# Patient Record
Sex: MALE | Race: WHITE | Employment: FULL TIME | URBAN - METROPOLITAN AREA
[De-identification: names, ages, dates, MRNs, and addresses within clinical notes are randomized per-mention and may not be internally consistent; named-entity substitution may affect disease eponyms.]

---

## 2017-07-26 PROBLEM — E78.5 HYPERLIPIDEMIA LDL GOAL <100: Status: ACTIVE | Noted: 2017-07-26

## 2017-07-26 PROBLEM — N20.0 KIDNEY STONES: Status: ACTIVE | Noted: 2017-07-26

## 2017-09-25 PROCEDURE — 88305 TISSUE EXAM BY PATHOLOGIST: CPT | Performed by: INTERNAL MEDICINE

## 2017-09-27 PROBLEM — J30.1 ACUTE SEASONAL ALLERGIC RHINITIS DUE TO POLLEN: Status: ACTIVE | Noted: 2017-09-27

## 2018-11-06 PROBLEM — I10 ESSENTIAL HYPERTENSION: Status: ACTIVE | Noted: 2018-11-06

## 2018-11-06 PROCEDURE — 84402 ASSAY OF FREE TESTOSTERONE: CPT | Performed by: FAMILY MEDICINE

## 2018-11-06 PROCEDURE — 84403 ASSAY OF TOTAL TESTOSTERONE: CPT | Performed by: FAMILY MEDICINE

## 2018-11-06 PROCEDURE — 86803 HEPATITIS C AB TEST: CPT | Performed by: FAMILY MEDICINE

## 2021-01-19 PROBLEM — R42 VERTIGO: Status: ACTIVE | Noted: 2017-09-07

## 2021-03-25 ENCOUNTER — APPOINTMENT (OUTPATIENT)
Dept: GENERAL RADIOLOGY | Facility: HOSPITAL | Age: 66
DRG: 177 | End: 2021-03-25
Attending: EMERGENCY MEDICINE
Payer: COMMERCIAL

## 2021-03-25 ENCOUNTER — HOSPITAL ENCOUNTER (INPATIENT)
Facility: HOSPITAL | Age: 66
LOS: 9 days | Discharge: HOME | DRG: 177 | End: 2021-04-03
Attending: EMERGENCY MEDICINE | Admitting: STUDENT IN AN ORGANIZED HEALTH CARE EDUCATION/TRAINING PROGRAM
Payer: COMMERCIAL

## 2021-03-25 ENCOUNTER — APPOINTMENT (OUTPATIENT)
Dept: CT IMAGING | Facility: HOSPITAL | Age: 66
DRG: 177 | End: 2021-03-25
Attending: EMERGENCY MEDICINE
Payer: COMMERCIAL

## 2021-03-25 DIAGNOSIS — U07.1 PNEUMONIA DUE TO COVID-19 VIRUS: Primary | ICD-10-CM

## 2021-03-25 DIAGNOSIS — R09.02 HYPOXIA: ICD-10-CM

## 2021-03-25 DIAGNOSIS — J12.82 PNEUMONIA DUE TO COVID-19 VIRUS: Primary | ICD-10-CM

## 2021-03-25 PROBLEM — D69.6 THROMBOCYTOPENIA (HCC): Status: ACTIVE | Noted: 2021-03-25

## 2021-03-25 PROCEDURE — 83880 ASSAY OF NATRIURETIC PEPTIDE: CPT | Performed by: EMERGENCY MEDICINE

## 2021-03-25 PROCEDURE — 80048 BASIC METABOLIC PNL TOTAL CA: CPT | Performed by: EMERGENCY MEDICINE

## 2021-03-25 PROCEDURE — 99285 EMERGENCY DEPT VISIT HI MDM: CPT

## 2021-03-25 PROCEDURE — 84145 PROCALCITONIN (PCT): CPT | Performed by: EMERGENCY MEDICINE

## 2021-03-25 PROCEDURE — 83036 HEMOGLOBIN GLYCOSYLATED A1C: CPT | Performed by: HOSPITALIST

## 2021-03-25 PROCEDURE — 85025 COMPLETE CBC W/AUTO DIFF WBC: CPT | Performed by: EMERGENCY MEDICINE

## 2021-03-25 PROCEDURE — 3E0333Z INTRODUCTION OF ANTI-INFLAMMATORY INTO PERIPHERAL VEIN, PERCUTANEOUS APPROACH: ICD-10-PCS | Performed by: STUDENT IN AN ORGANIZED HEALTH CARE EDUCATION/TRAINING PROGRAM

## 2021-03-25 PROCEDURE — 71260 CT THORAX DX C+: CPT | Performed by: EMERGENCY MEDICINE

## 2021-03-25 PROCEDURE — 82728 ASSAY OF FERRITIN: CPT | Performed by: EMERGENCY MEDICINE

## 2021-03-25 PROCEDURE — XW033E5 INTRODUCTION OF REMDESIVIR ANTI-INFECTIVE INTO PERIPHERAL VEIN, PERCUTANEOUS APPROACH, NEW TECHNOLOGY GROUP 5: ICD-10-PCS | Performed by: STUDENT IN AN ORGANIZED HEALTH CARE EDUCATION/TRAINING PROGRAM

## 2021-03-25 PROCEDURE — 85379 FIBRIN DEGRADATION QUANT: CPT | Performed by: EMERGENCY MEDICINE

## 2021-03-25 PROCEDURE — 84484 ASSAY OF TROPONIN QUANT: CPT | Performed by: EMERGENCY MEDICINE

## 2021-03-25 PROCEDURE — 81001 URINALYSIS AUTO W/SCOPE: CPT | Performed by: EMERGENCY MEDICINE

## 2021-03-25 PROCEDURE — 93005 ELECTROCARDIOGRAM TRACING: CPT

## 2021-03-25 PROCEDURE — 93010 ELECTROCARDIOGRAM REPORT: CPT | Performed by: EMERGENCY MEDICINE

## 2021-03-25 PROCEDURE — 96374 THER/PROPH/DIAG INJ IV PUSH: CPT

## 2021-03-25 PROCEDURE — 71045 X-RAY EXAM CHEST 1 VIEW: CPT | Performed by: EMERGENCY MEDICINE

## 2021-03-25 PROCEDURE — 86140 C-REACTIVE PROTEIN: CPT | Performed by: EMERGENCY MEDICINE

## 2021-03-25 PROCEDURE — 87040 BLOOD CULTURE FOR BACTERIA: CPT | Performed by: EMERGENCY MEDICINE

## 2021-03-25 PROCEDURE — 80076 HEPATIC FUNCTION PANEL: CPT | Performed by: HOSPITALIST

## 2021-03-25 RX ORDER — ASCORBIC ACID 500 MG
1000 TABLET ORAL DAILY
Status: DISCONTINUED | OUTPATIENT
Start: 2021-03-25 | End: 2021-04-03

## 2021-03-25 RX ORDER — AMLODIPINE BESYLATE AND BENAZEPRIL HYDROCHLORIDE 5; 10 MG/1; MG/1
1 CAPSULE ORAL DAILY
Status: DISCONTINUED | OUTPATIENT
Start: 2021-03-25 | End: 2021-03-25

## 2021-03-25 RX ORDER — POTASSIUM CHLORIDE 20 MEQ/1
40 TABLET, EXTENDED RELEASE ORAL ONCE
Status: COMPLETED | OUTPATIENT
Start: 2021-03-25 | End: 2021-03-25

## 2021-03-25 RX ORDER — DEXAMETHASONE SODIUM PHOSPHATE 4 MG/ML
6 VIAL (ML) INJECTION DAILY
Status: COMPLETED | OUTPATIENT
Start: 2021-03-25 | End: 2021-04-03

## 2021-03-25 RX ORDER — BENZONATATE 100 MG/1
100 CAPSULE ORAL 3 TIMES DAILY PRN
Status: DISCONTINUED | OUTPATIENT
Start: 2021-03-25 | End: 2021-04-03

## 2021-03-25 RX ORDER — ZINC SULFATE 50(220)MG
220 CAPSULE ORAL 2 TIMES DAILY
Status: DISCONTINUED | OUTPATIENT
Start: 2021-03-25 | End: 2021-04-03

## 2021-03-25 RX ORDER — GUAIFENESIN 600 MG
600 TABLET, EXTENDED RELEASE 12 HR ORAL 2 TIMES DAILY
Status: DISCONTINUED | OUTPATIENT
Start: 2021-03-25 | End: 2021-04-03

## 2021-03-25 RX ORDER — ENOXAPARIN SODIUM 100 MG/ML
40 INJECTION SUBCUTANEOUS DAILY
Status: DISCONTINUED | OUTPATIENT
Start: 2021-03-25 | End: 2021-04-03

## 2021-03-25 RX ORDER — ASPIRIN 81 MG/1
81 TABLET, CHEWABLE ORAL DAILY
Status: DISCONTINUED | OUTPATIENT
Start: 2021-03-25 | End: 2021-04-03

## 2021-03-25 RX ORDER — ONDANSETRON 2 MG/ML
4 INJECTION INTRAMUSCULAR; INTRAVENOUS EVERY 6 HOURS PRN
Status: DISCONTINUED | OUTPATIENT
Start: 2021-03-25 | End: 2021-04-03

## 2021-03-25 RX ORDER — DEXAMETHASONE SODIUM PHOSPHATE 4 MG/ML
6 VIAL (ML) INJECTION ONCE
Status: COMPLETED | OUTPATIENT
Start: 2021-03-25 | End: 2021-03-25

## 2021-03-25 RX ORDER — ACETAMINOPHEN 325 MG/1
650 TABLET ORAL EVERY 6 HOURS PRN
Status: DISCONTINUED | OUTPATIENT
Start: 2021-03-25 | End: 2021-04-03

## 2021-03-25 RX ORDER — GUAIFENESIN 100 MG/5ML
100 SOLUTION ORAL EVERY 4 HOURS PRN
Status: DISCONTINUED | OUTPATIENT
Start: 2021-03-25 | End: 2021-04-03

## 2021-03-25 RX ORDER — ALBUTEROL SULFATE 90 UG/1
4 AEROSOL, METERED RESPIRATORY (INHALATION) EVERY 4 HOURS PRN
Status: DISCONTINUED | OUTPATIENT
Start: 2021-03-25 | End: 2021-04-03

## 2021-03-25 RX ORDER — FLUTICASONE PROPIONATE 50 MCG
2 SPRAY, SUSPENSION (ML) NASAL DAILY
Refills: 3 | Status: DISCONTINUED | OUTPATIENT
Start: 2021-03-25 | End: 2021-04-03

## 2021-03-25 RX ORDER — ATORVASTATIN CALCIUM 20 MG/1
20 TABLET, FILM COATED ORAL DAILY
Status: DISCONTINUED | OUTPATIENT
Start: 2021-03-25 | End: 2021-04-03

## 2021-03-25 RX ORDER — DEXAMETHASONE 6 MG/1
6 TABLET ORAL DAILY
Status: COMPLETED | OUTPATIENT
Start: 2021-03-25 | End: 2021-04-03

## 2021-03-25 NOTE — CONSULTS
Pulmonary Consult     Assessment / Plan:  1.  Hypoxia  - Supplemental O2, wean as tolerate  - Needs to self-prone  - CTA chest negative for PE  2. COVID-19 pneumonia w/ superimposed bacterial infection  - Pct elevated 0.89, possible bacterial superinfection two-five, Disp: 6 tablet, Rfl: 0  Mometasone Furoate 50 MCG/ACT Nasal Suspension, INSTILL 1 SPRAY INTO EACH NOSTRIL EVERY DAY, Disp: 1 Bottle, Rfl: 3  aspirin 81 MG Oral Chew Tab, Chew 1 mg by mouth daily. , Disp: , Rfl:   Coenzyme Q10 (CO Q-10) 100 MG Oral Comment: occ    Drug use: No      Family History   Problem Relation Age of Onset   • Other (alcoholic) Father    • Cancer Mother    • Cancer Sister          Exam:   03/25/21  1015 03/25/21  1045 03/25/21  1100 03/25/21  1130   BP: 131/75 129/81 133/79 138/

## 2021-03-25 NOTE — ED PROVIDER NOTES
Patient Seen in: [facility] Emergency Department      History   Patient presents with:  Covid    Stated Complaint: SOB/Covid    HPI/Subjective:   HPI    The patient is a 78-year-old male with a history of hypertension and high cholesterol who pres present. Mouth/Throat:      Mouth: Mucous membranes are moist.   Eyes:      Conjunctiva/sclera: Conjunctivae normal.      Pupils: Pupils are equal, round, and reactive to light. Neck:      Vascular: No JVD.    Cardiovascular:      Rate and Rhythm: Re PROTEIN - Abnormal; Notable for the following components:    C-Reactive Protein 7.19 (*)     All other components within normal limits   FERRITIN - Abnormal; Notable for the following components:    Ferritin 1,442.5 (*)     All other components within norm advised.     Dictated by (CST): Eric Burdick MD on 3/25/2021 at 10:18 AM     Finalized by (CST): Eric Burdick MD on 3/25/2021 at 10:21 AM            Radiology exams  Viewed and reviewed by myself and findings discussed with patient including need for fo

## 2021-03-25 NOTE — ED INITIAL ASSESSMENT (HPI)
Patient here with c/o increasing SOB over the last week. Diagnosed with covid on 3/20 and received the monoclonal antibody infusion that day. Patient with initial 02 sat 86% on RA.

## 2021-03-25 NOTE — ED NOTES
Orders for admission, patient is aware of plan and ready to go upstairs. Any questions, please call ED RN [facility] at extension [phone].    Type of COVID test sent: None  COVID Suspicion level: + Covid 3/20    Titratable drug(s) infusing: none  Rate:    LOC at t

## 2021-03-25 NOTE — PROGRESS NOTES
[facility] Pharmacy Note:  Dose Adjustment for azithromycin (Jacy Woods)    Gracia Turcios is a 60year old patient who has been prescribed azithromycin (ZITHROMAX) 250 mg every 24 hrs. The estimated creatinine clearance is 61.7 mL/min (based on SCr of 1.21 mg/dL).  [employer]

## 2021-03-25 NOTE — PROGRESS NOTES
Therapeutic interchange from amlodipine/benazapril To amlodipine/lisinopril  per P&T approved protocol.

## 2021-03-25 NOTE — H&P
[facility] Hospitalist H&P     CC: Patient presents with:  Covid     PCP: Velia Stubbs MD    Date of Admission: 3/25/2021  9:40 AM    ASSESSMENT / PLAN:     Ms. Imtiaz Chavez is a 61 yo M with PMH of HTN, HL who was recently diagnosed with COVID on 3/20, received home, O2 sat low at home so came to the ER. Wife and disabled son both with COVID symptoms at home.      PMH  Past Medical History:   Diagnosis Date   • Essential hypertension    • Hyperlipidemia         PSH  Past Surgical History:   Procedure Laterality Da --   --  73   GFRNAA  --   --  63   CA 8.7 8.6 8.2*   * 136 135*   K 3.67 3.33* 3.4*   CL 98 99 102   CO2 24.9 26.1 27.0     Lab Results   Component Value Date    WBC 5.0 03/25/2021    HGB 13.2 03/25/2021    HCT 37.5 03/25/2021    .0 03/25/202 Multifocal bilateral pneumonia compatible with severe COVID-19. 3. Multivessel coronary artery calcification. 4. Mild hepatic steatosis.     Dictated by (CST): Claudette Milan MD on 3/25/2021 at 11:55 AM     Finalized by (CST): Claudette Milan MD on 3/25/2021

## 2021-03-26 PROCEDURE — 83615 LACTATE (LD) (LDH) ENZYME: CPT | Performed by: HOSPITALIST

## 2021-03-26 PROCEDURE — 86140 C-REACTIVE PROTEIN: CPT | Performed by: STUDENT IN AN ORGANIZED HEALTH CARE EDUCATION/TRAINING PROGRAM

## 2021-03-26 PROCEDURE — 84145 PROCALCITONIN (PCT): CPT | Performed by: STUDENT IN AN ORGANIZED HEALTH CARE EDUCATION/TRAINING PROGRAM

## 2021-03-26 PROCEDURE — 83520 IMMUNOASSAY QUANT NOS NONAB: CPT | Performed by: INTERNAL MEDICINE

## 2021-03-26 PROCEDURE — 86140 C-REACTIVE PROTEIN: CPT | Performed by: HOSPITALIST

## 2021-03-26 PROCEDURE — 82728 ASSAY OF FERRITIN: CPT | Performed by: HOSPITALIST

## 2021-03-26 PROCEDURE — 87389 HIV-1 AG W/HIV-1&-2 AB AG IA: CPT | Performed by: INTERNAL MEDICINE

## 2021-03-26 PROCEDURE — 85379 FIBRIN DEGRADATION QUANT: CPT | Performed by: STUDENT IN AN ORGANIZED HEALTH CARE EDUCATION/TRAINING PROGRAM

## 2021-03-26 PROCEDURE — 86706 HEP B SURFACE ANTIBODY: CPT | Performed by: INTERNAL MEDICINE

## 2021-03-26 PROCEDURE — 86480 TB TEST CELL IMMUN MEASURE: CPT | Performed by: INTERNAL MEDICINE

## 2021-03-26 PROCEDURE — 83615 LACTATE (LD) (LDH) ENZYME: CPT | Performed by: STUDENT IN AN ORGANIZED HEALTH CARE EDUCATION/TRAINING PROGRAM

## 2021-03-26 PROCEDURE — 85379 FIBRIN DEGRADATION QUANT: CPT | Performed by: HOSPITALIST

## 2021-03-26 PROCEDURE — 87340 HEPATITIS B SURFACE AG IA: CPT | Performed by: INTERNAL MEDICINE

## 2021-03-26 PROCEDURE — 80053 COMPREHEN METABOLIC PANEL: CPT | Performed by: EMERGENCY MEDICINE

## 2021-03-26 PROCEDURE — 85025 COMPLETE CBC W/AUTO DIFF WBC: CPT | Performed by: HOSPITALIST

## 2021-03-26 NOTE — PLAN OF CARE
Problem: Patient Centered Care  Goal: Patient preferences are identified and integrated in the patient's plan of care  Description: Interventions:  - What would you like us to know as we care for you?  I live at home with my wife  - Provide timely, comple

## 2021-03-26 NOTE — H&P
[facility] Hospitalist Progress Note     CC: Patient presents with:  Covid     PCP: Samra Meza MD    Date of Admission: 3/25/2021  9:40 AM    ASSESSMENT / PLAN:     Ms. Kam Schwartz is a 59 yo M with PMH of HTN, HL who was recently diagnosed with COVID on 3/20, POSSIBLE POLYPECTOMY 61007 N/A 9/23/2017    Performed by Ricardo De La Fuente MD at [facility]   • OTHER SURGICAL HISTORY      part of bowel removed for \"twisted bowel\"        ALL:  No Known Allergies     Home Medications:  Mometasone Furoate 5 Temp 98.2 °F (36.8 °C) (Oral)   Resp 18   Ht 5' 9\" (1.753 m)   Wt 179 lb 6.4 oz (81.4 kg)   SpO2 90%   BMI 26.49 kg/m²     GEN: male in NAD, mild tachypnea  HEENT: EOMI  Pulm: CTAB, no crackles or wheezes, on supplemental o2  CV: regular rate, regular rhy and organizing pneumonia, as can be seen with drug toxicity and connective tissue disease, can cause a similar imaging pattern. Expert Consensus Statement on Reporting Chest CT Findings Related to COVID-19.  Endorsed by the Society of Thoracic Radiology, th

## 2021-03-26 NOTE — PROGRESS NOTES
ABCs intact. Pt c/o L knee pain x 5 days. Pt has been taking ibuprofen for pain.    Janay Soto is a 60year old male with hist of HTN, HL, diagnosed with COVID on 3/20, received BAM at that time. now admitted to [address] [place] [address] on 3/25 with worsening SOB,     HPI:    Patient in covid isolation,   Previous entries noted,   No new complaints,   Cough is 112 mmol/L    CO2 27.0 21.0 - 32.0 mmol/L    Anion Gap 6 0 - 18 mmol/L    BUN 13 7 - 18 mg/dL    Creatinine 1.21 0.70 - 1.30 mg/dL    BUN/CREA Ratio 10.7 10.0 - 20.0    Calcium, Total 8.2 (L) 8.5 - 10.1 mg/dL    Calculated Osmolality 281 275 - 295 mOsm/kg (H) 70 - 99 mg/dL    Sodium 137 136 - 145 mmol/L    Potassium 3.6 3.5 - 5.1 mmol/L    Chloride 106 98 - 112 mmol/L    CO2 22.0 21.0 - 32.0 mmol/L    Anion Gap 9 0 - 18 mmol/L    BUN 26 (H) 7 - 18 mg/dL    Creatinine 1.29 0.70 - 1.30 mg/dL    BUN/CREA Ratio DRAW GOLD   Result Value Ref Range    Hold Gold Auto Resulted    BLOOD CULTURE    Specimen: Blood,peripheral   Result Value Ref Range    Blood Culture Result No Growth 1 Day    BLOOD CULTURE    Specimen: Blood,peripheral   Result Value Ref Range    Blood C 0.0 %    Basophil % 0.2 %    Immature Granulocyte % 0.5 %       ROS:   GENERAL HEALTH: No fevers, chills, unexpected weight loss. HEENT:  Denies sore throat, neck pain,neck rigidity, difficulty swallowing, swollen glands.   RESPIRATORY:  Denies SOB or sign Trending temps and WBCs. Continue antibiotics as well as steroids and remdesivir. Follow  IL6, quantiferon, in case needs  baricitinib or actemra.    I discussed my Assessment and Plan in detail with the patient on phone, will follow,

## 2021-03-26 NOTE — DIETARY NOTE
ADULT NUTRITION INITIAL ASSESSMENT    Pt is at moderate nutrition risk. Pt does not meet malnutrition criteria.       RECOMMENDATIONS TO MD:  See Nutrition Intervention    ADMITTING DIAGNOSIS:   Hypoxia [R09.02]  Pneumonia due to COVID-19 virus [U07.1, J12 OSMOCALC  --  197 160     GASTROINTESTINAL: no GI issues reported per pt/chart and +BM 3/25  NUTRITION RELATED PHYSICAL FINDINGS:  - Body Fat/Muscle Mass: No wasting noted per visual exam.  - Fluid Accumulation: none per RN documentation  - Skin Integrit with other providers  - Discharge and transfer of nutrition care to new setting or provider: to be determined    MONITOR AND EVALUATE/NUTRITION GOALS:  - Food and Nutrient Intake:      Monitor: adequacy of PO intake, tolerance of PO intake, adequacy of sup

## 2021-03-26 NOTE — PROGRESS NOTES
Pulmonary Progress Note     Assessment / Plan:  1.  Hypoxia  - Supplemental O2, wean as tolerate (currently 7L)  - Recommend self proning as often as possible  - CTA chest negative for PE  2. COVID-19 pneumonia w/ superimposed bacterial infection  - Pct ines

## 2021-03-26 NOTE — CONSULTS
[facility] Infectious Disease  Report of Consultation    Jermaine Mcmahan Patient Status:  Inpatient    1955 MRN [id number]   Location [facility] Attending Parmjit Ramirez MD   Hosp Day # 0 PCP Samra Meza MD     Date of bolus 500 mL, 500 mL, Intravenous, PRN  •  guaiFENesin ER (MUCINEX) 12 hr tab 600 mg, 600 mg, Oral, BID  •  Albuterol Sulfate  (90 Base) MCG/ACT inhaler 4 puff, 4 puff, Inhalation, Q4H PRN  •  zinc sulfate (ZINCATE) cap 220 mg, 220 mg, Oral, BID  • lymphadenopathy. Musculoskeletal: Negative for myalgias, arthralgias, muscle weakness. Neurological: No focal neurologic deficits, seizures, tremors. Psych:  No h/o anxiety, depression, other psych d/o.    Endocrine: No history of of diabetes, thyroid  03/25/2021    CA 8.2 03/25/2021    ALB 3.3 03/25/2021    ALKPHO 83 03/25/2021    BILT 0.7 03/25/2021    TP 7.1 03/25/2021    AST 64 03/25/2021    ALT 43 03/25/2021    DDIMER 0.95 03/25/2021    CRP 7.19 03/25/2021    TROP <0.045 03/25/2021        Cu

## 2021-03-27 PROCEDURE — 85025 COMPLETE CBC W/AUTO DIFF WBC: CPT | Performed by: HOSPITALIST

## 2021-03-27 PROCEDURE — XW0DXM6 INTRODUCTION OF BARICITINIB INTO MOUTH AND PHARYNX, EXTERNAL APPROACH, NEW TECHNOLOGY GROUP 6: ICD-10-PCS | Performed by: STUDENT IN AN ORGANIZED HEALTH CARE EDUCATION/TRAINING PROGRAM

## 2021-03-27 PROCEDURE — 83615 LACTATE (LD) (LDH) ENZYME: CPT | Performed by: HOSPITALIST

## 2021-03-27 PROCEDURE — 82728 ASSAY OF FERRITIN: CPT | Performed by: HOSPITALIST

## 2021-03-27 PROCEDURE — 82962 GLUCOSE BLOOD TEST: CPT

## 2021-03-27 PROCEDURE — 85379 FIBRIN DEGRADATION QUANT: CPT | Performed by: HOSPITALIST

## 2021-03-27 PROCEDURE — 86140 C-REACTIVE PROTEIN: CPT | Performed by: HOSPITALIST

## 2021-03-27 PROCEDURE — 80053 COMPREHEN METABOLIC PANEL: CPT | Performed by: EMERGENCY MEDICINE

## 2021-03-27 NOTE — PLAN OF CARE
Problem: Patient Centered Care  Goal: Patient preferences are identified and integrated in the patient's plan of care  Description: Interventions:  - What would you like us to know as we care for you?  I live at home with my wife  - Provide timely, comple schedule  Outcome: Progressing     Problem: Patient/Family Goals  Goal: Patient/Family Long Term Goal  Description: Patient's Long Term Goal: to go home    Interventions:  - Follow MD recommendations  - Monitor VS  - Monitor Labs  - Discharge Planning  - S

## 2021-03-27 NOTE — PROGRESS NOTES
[facility] AND [facility] Infectious Disease  Progress Note    Robbni Howell Patient Status:  Inpatient    1955 MRN [id number]   Location [address] [place] [address] Attending Chuck Castañeda, [address] [facility] Day # 2 PCP Karen Benjamin MD     Subjective:  Patient' #3    2.  Abnormal labs due to COVID+ status  (Hepatitis negative, HIV negative,   Recent Labs   Lab 03/25/21  0949 03/25/21  0949 03/26/21  0047 03/26/21  0047 03/26/21  0609 03/27/21  0551   LDH  --   --  409*   < > 395* 459*   SANDY 1,442.5*   < >  --   -

## 2021-03-27 NOTE — H&P
[facility] Hospitalist Progress Note     CC: Patient presents with:  Covid     PCP: Tasia Hernandez MD    Date of Admission: 3/25/2021  9:40 AM    ASSESSMENT / PLAN:     Ms. Annemarie Mccord is a 59 yo M with PMH of HTN, HL who was recently diagnosed with COVID on 3/20, Laterality Date   • APPENDECTOMY     • CHOLECYSTECTOMY     • COLONOSCOPY     • COLONOSCOPY, POSSIBLE BIOPSY, POSSIBLE POLYPECTOMY 06252 N/A 9/23/2017    Performed by Trinity Morrow MD at [facility]   • OTHER SURGICAL HISTORY      part of amanda negative except for what's stated above.      OBJECTIVE:  /81 (BP Location: Right arm)   Pulse 93   Temp 98 °F (36.7 °C) (Oral)   Resp 18   Ht 5' 9\" (1.753 m)   Wt 179 lb 6.4 oz (81.4 kg)   SpO2 90%   BMI 26.49 kg/m²     GEN: male in NAD, mild tachyp discussed above suggesting bilateral pneumonia. Consider atypical viral etiology. Correlate clinically and follow-up studies are advised.     Dictated by (CST): Ofe Hernandez MD on 3/25/2021 at 10:18 AM     Finalized by (CST): Ofe Hernandez MD on 3/25/2

## 2021-03-27 NOTE — PROGRESS NOTES
Wilyme 2 Patient Status:  Inpatient    1955 MRN [id number]   Location [address] [facility] Attending Lito Caller, [address] [facility] Day # 2 PCP Carlos Erickson MD     Pulm / Critical Care Progress Note     S: feels about the same normal S1S2, no murmur. Abdomen: soft, non-tender, non-distended, positive BS.    Extremity: no edema     Recent Labs   Lab 03/25/21  0949 03/26/21  0609 03/27/21  0551   WBC 5.0 5.8 10.0   HGB 13.2 13.0 12.7*   HCT 37.5* 36.8* 36.8*   .0* 124.0* 1 LMWH  4. Dispo  - Inpatient, full code  - Will follow along with you, please call with questions       Jordan Bro M.D.  [facility] pulmonary / critical care  3/27/2021  8:37 AM

## 2021-03-27 NOTE — PLAN OF CARE
No acute changes. Continues on 6 L O2 with saturations in the high 80's/low 90's. Denies pain. Safety precautions maintained. Call light within reach.     Problem: Patient Centered Care  Goal: Patient preferences are identified and integrated in the patient response  - Implement non-pharmacological measures as appropriate and evaluate response  - Consider cultural and social influences on pain and pain management  - Manage/alleviate anxiety  - Utilize distraction and/or relaxation techniques  - Monitor for op for coordinating discharge planning if the patient needs post-hospital services based on physician/LIP order or complex needs related to functional status, cognitive ability or social support system  3/27/2021 1629 by Aysha Rodriguez RN  Outcome: Progress

## 2021-03-28 PROCEDURE — 83615 LACTATE (LD) (LDH) ENZYME: CPT | Performed by: HOSPITALIST

## 2021-03-28 PROCEDURE — 85379 FIBRIN DEGRADATION QUANT: CPT | Performed by: HOSPITALIST

## 2021-03-28 PROCEDURE — 86140 C-REACTIVE PROTEIN: CPT | Performed by: HOSPITALIST

## 2021-03-28 PROCEDURE — 82728 ASSAY OF FERRITIN: CPT | Performed by: HOSPITALIST

## 2021-03-28 PROCEDURE — 85025 COMPLETE CBC W/AUTO DIFF WBC: CPT | Performed by: HOSPITALIST

## 2021-03-28 PROCEDURE — 83735 ASSAY OF MAGNESIUM: CPT | Performed by: INTERNAL MEDICINE

## 2021-03-28 PROCEDURE — 80053 COMPREHEN METABOLIC PANEL: CPT | Performed by: EMERGENCY MEDICINE

## 2021-03-28 RX ORDER — ECHINACEA PURPUREA EXTRACT 125 MG
1 TABLET ORAL
Status: DISCONTINUED | OUTPATIENT
Start: 2021-03-28 | End: 2021-04-03

## 2021-03-28 RX ORDER — DOCUSATE SODIUM 100 MG/1
100 CAPSULE, LIQUID FILLED ORAL 2 TIMES DAILY
Status: DISCONTINUED | OUTPATIENT
Start: 2021-03-28 | End: 2021-04-03

## 2021-03-28 NOTE — PROGRESS NOTES
[facility] AND [facility]  [address] Infectious Disease  Progress Note    Esta Falling Patient Status:  Inpatient    1955 MRN [id number]   Location [address] [facility] Attending Alan Solano, [address] [facility] Day # 3 PCP Guillermo Nevarez MD     Subjective:  Clinical #2  - Ceftriaxone and azithromycin day #4     2.  Abnormal labs due to COVID+ status  (Hepatitis negative, HIV negative)  Recent Labs   Lab 03/25/21  0949 03/25/21  0949 03/26/21  0047 03/26/21  0609 03/27/21  0551 03/28/21  0538   LDH  --   --  409*   < >

## 2021-03-28 NOTE — PLAN OF CARE
Problem: Patient Centered Care  Goal: Patient preferences are identified and integrated in the patient's plan of care  Description: Interventions:  - What would you like us to know as we care for you?  I live at home with my wife  - Provide timely, comple schedule  Outcome: Progressing     Problem: DISCHARGE PLANNING  Goal: Discharge to home or other facility with appropriate resources  Description: INTERVENTIONS:  - Identify barriers to discharge w/pt and caregiver  - Include patient/family/discharge partn including cough, deep breathe, Incentive Spirometry  - Assess the need for suctioning and perform as needed  - Assess and instruct to report SOB or any respiratory difficulty  - Respiratory Therapy support as indicated  - Manage/alleviate anxiety  - [place]

## 2021-03-28 NOTE — PROGRESS NOTES
Nur[facility] 2 Patient Status:  Inpatient    1955 MRN [id number]   Location [address] [facility] Attending [facility], [address] [facility] Day # 3 PCP Velia Stubbs MD     Pulm / Critical Care Progress Note     S: slept a bit better o BS.   Extremity: no edema     Recent Labs   Lab 03/26/21  0609 03/27/21  0551 03/28/21  0538   WBC 5.8 10.0 8.3   HGB 13.0 12.7* 12.6*   HCT 36.8* 36.8* 36.5*   .0* 145.0* 156.0     No results for input(s): INR in the last 168 hours.       Recent Lab

## 2021-03-28 NOTE — PLAN OF CARE
Pt desat with exertion & coughing. O2 increased to 7L as needed. Continuous O2 monitor in place. Pt resting comfortably, call light within reach.     Problem: Patient Centered Care  Goal: Patient preferences are identified and integrated in the patient's pl unsuccessful or patient reports new pain  - Anticipate increased pain with activity and pre-medicate as appropriate  Outcome: Progressing     Problem: SAFETY ADULT - FALL  Goal: Free from fall injury  Description: INTERVENTIONS:  - Assess pt frequently for supplementation based on oxygen saturation or ABGs  - Provide Smoking Cessation handout, if applicable  - Encourage broncho-pulmonary hygiene including cough, deep breathe, Incentive Spirometry  - Assess the need for suctioning and perform as needed  - Ass

## 2021-03-28 NOTE — H&P
[facility] Hospitalist Progress Note     CC: Patient presents with:  Covid     PCP: Ashley Hutchins MD    Date of Admission: 3/25/2021  9:40 AM    ASSESSMENT / PLAN:     Ms. Cornell Bauer is a 61 yo M with PMH of HTN, HL who was recently diagnosed with COVID on 3/20, HISTORY      part of bowel removed for \"twisted bowel\"        ALL:  No Known Allergies     Home Medications:  Mometasone Furoate 50 MCG/ACT Nasal Suspension, INSTILL 1 SPRAY INTO EACH NOSTRIL EVERY DAY, Disp: 1 Bottle, Rfl: 3  aspirin 81 MG Oral Chew Tab kg/m²     GEN: male in NAD, mild tachypnea  HEENT: EOMI  Pulm: CTAB, no crackles or wheezes, on supplemental o2  CV: regular rate, regular rhythm, no murmurs  ABD: Soft, non-tender, non-distended, +BS  Neuro: Grossly normal, CN intact, sensory intact  Psyc

## 2021-03-29 PROCEDURE — 80053 COMPREHEN METABOLIC PANEL: CPT | Performed by: EMERGENCY MEDICINE

## 2021-03-29 PROCEDURE — 85025 COMPLETE CBC W/AUTO DIFF WBC: CPT | Performed by: STUDENT IN AN ORGANIZED HEALTH CARE EDUCATION/TRAINING PROGRAM

## 2021-03-29 PROCEDURE — 82728 ASSAY OF FERRITIN: CPT | Performed by: STUDENT IN AN ORGANIZED HEALTH CARE EDUCATION/TRAINING PROGRAM

## 2021-03-29 PROCEDURE — 85379 FIBRIN DEGRADATION QUANT: CPT | Performed by: STUDENT IN AN ORGANIZED HEALTH CARE EDUCATION/TRAINING PROGRAM

## 2021-03-29 PROCEDURE — 86140 C-REACTIVE PROTEIN: CPT | Performed by: STUDENT IN AN ORGANIZED HEALTH CARE EDUCATION/TRAINING PROGRAM

## 2021-03-29 PROCEDURE — 83615 LACTATE (LD) (LDH) ENZYME: CPT | Performed by: STUDENT IN AN ORGANIZED HEALTH CARE EDUCATION/TRAINING PROGRAM

## 2021-03-29 NOTE — CM/SW NOTE
Per nursing rounds pt is proning as tolerated. O2 needs ranging from 6L-9L HF O2.    CM/SW to remain available for support and/or discharge planning.     Albina Fernández RN, [facility]    Ext. [phone]

## 2021-03-29 NOTE — PROGRESS NOTES
[facility] AND [facility] Infectious Disease  Progress Note    Jermaine Mcmahan Patient Status:  Inpatient    1955 MRN [id number]   Location [address] [place] [address] Attending Leonel King, [address] [facility] Day # 4 PCP Samra Meza MD     Subjective:  Patient'  Abnormal labs due to COVID+ status  (Hepatitis negative, HIV negative)  Recent Labs   Lab 03/25/21  0949 03/25/21  0949 03/26/21  0047 03/26/21  0609 03/28/21  0538 03/29/21  0527   LDH  --   --  409*   < > 498* 529*   SANDY 1,442.5*  --   --    < > 1,639. 4

## 2021-03-29 NOTE — PLAN OF CARE
Problem: Patient Centered Care  Goal: Patient preferences are identified and integrated in the patient's plan of care  Description: Interventions:  - What would you like us to know as we care for you?  I live at home with my wife  - Provide timely, comple Problem: SAFETY ADULT - FALL  Goal: Free from fall injury  Description: INTERVENTIONS:  - Assess pt frequently for physical needs  - Identify cognitive and physical deficits and behaviors that affect risk of falls.   - [place] fall precautions as indica hygiene including cough, deep breathe, Incentive Spirometry  - Assess the need for suctioning and perform as needed  - Assess and instruct to report SOB or any respiratory difficulty  - Respiratory Therapy support as indicated  - Manage/alleviate anxiety

## 2021-03-29 NOTE — PROGRESS NOTES
Pulmonary Progress Note     Assessment / Plan:  1. Acute hypoxic resp failure - due to COVID-19. CTA chest negative for PE  - wean supplemental O2 as able  - self prone  2.  COVID-19 pneumonia w/ superimposed bacterial infection  - received BAM infusion 3/2

## 2021-03-29 NOTE — PLAN OF CARE
Problem: Patient Centered Care  Goal: Patient preferences are identified and integrated in the patient's plan of care  Description: Interventions:  - What would you like us to know as we care for you?  I live at home with my wife  - Provide timely, comple Problem: SAFETY ADULT - FALL  Goal: Free from fall injury  Description: INTERVENTIONS:  - Assess pt frequently for physical needs  - Identify cognitive and physical deficits and behaviors that affect risk of falls.   - [place] fall precautions as indica hygiene including cough, deep breathe, Incentive Spirometry  - Assess the need for suctioning and perform as needed  - Assess and instruct to report SOB or any respiratory difficulty  - Respiratory Therapy support as indicated  - Manage/alleviate anxiety

## 2021-03-30 PROCEDURE — 80053 COMPREHEN METABOLIC PANEL: CPT | Performed by: EMERGENCY MEDICINE

## 2021-03-30 PROCEDURE — 82728 ASSAY OF FERRITIN: CPT | Performed by: STUDENT IN AN ORGANIZED HEALTH CARE EDUCATION/TRAINING PROGRAM

## 2021-03-30 PROCEDURE — 83735 ASSAY OF MAGNESIUM: CPT | Performed by: HOSPITALIST

## 2021-03-30 PROCEDURE — 85025 COMPLETE CBC W/AUTO DIFF WBC: CPT | Performed by: STUDENT IN AN ORGANIZED HEALTH CARE EDUCATION/TRAINING PROGRAM

## 2021-03-30 PROCEDURE — 83615 LACTATE (LD) (LDH) ENZYME: CPT | Performed by: STUDENT IN AN ORGANIZED HEALTH CARE EDUCATION/TRAINING PROGRAM

## 2021-03-30 PROCEDURE — 86140 C-REACTIVE PROTEIN: CPT | Performed by: STUDENT IN AN ORGANIZED HEALTH CARE EDUCATION/TRAINING PROGRAM

## 2021-03-30 NOTE — PLAN OF CARE
Problem: Patient Centered Care  Goal: Patient preferences are identified and integrated in the patient's plan of care  Description: Interventions:  - What would you like us to know as we care for you?  I live at home with my wife  - Provide timely, comple Educate pt/family on patient safety including physical limitations  - Instruct pt to call for assistance with activity based on assessment  - Modify environment to reduce risk of injury  - Provide assistive devices as appropriate  - Consider OT/PT consult

## 2021-03-30 NOTE — H&P
[facility] Hospitalist Progress Note     CC: Patient presents with:  Covid     PCP: Guillermo Nevarez MD    Date of Admission: 3/25/2021  9:40 AM    ASSESSMENT / PLAN:     Ms. Johnny Hernandez is a 59 yo M with PMH of HTN, HL who was recently diagnosed with COVID on 3/20, Roseanne Stack MD at [facility]   • OTHER SURGICAL HISTORY      part of bowel removed for \"twisted bowel\"        ALL:  No Known Allergies     Home Medications:  Mometasone Furoate 50 MCG/ACT Nasal Suspension, INSTILL 1 SPRAY INTO EACH NOSTRIL FARHAD Wt 179 lb 6.4 oz (81.4 kg)   SpO2 94%   BMI 26.49 kg/m²     GEN: male in NAD, mild tachypnea, tired  HEENT: EOMI  Pulm: CTAB, no crackles or wheezes, on supplemental o2  CV: regular rate, regular rhythm, no murmurs  ABD: Soft, non-tender, non-distended, +B

## 2021-03-30 NOTE — PROGRESS NOTES
[facility] AND [facility] Infectious Disease  Progress Note    Juanjose Almeida Patient Status:  Inpatient    1955 MRN [id number]   Location [facility], [address] [facility] Day # 5 PCP Velia Stubbs MD     Subjective:  Patient' and azithromycin day #5/10    2.  Abnormal labs due to COVID+ status  (Hepatitis negative, HIV negative)  Recent Labs   Lab 03/25/21  0949 03/25/21  7269 03/26/21  0047 03/26/21  0609 03/28/21  0538 03/28/21  0538 03/29/21  0527 03/30/21  0451   LDH  --

## 2021-03-30 NOTE — PLAN OF CARE
On 8L of oxygen via high flow nasal cannula. Dyspnea on exertion and desaturated. He self prones. Fall precautions are in place.      Problem: Patient Centered Care  Goal: Patient preferences are identified and integrated in the patient's plan of care  Desc injury  - Provide assistive devices as appropriate  - Consider OT/PT consult to assist with strengthening/mobility  - Encourage toileting schedule  Outcome: Progressing     Problem: DISCHARGE PLANNING  Goal: Discharge to home or other facility with appropr supplementation based on oxygen saturation or ABGs  - Provide Smoking Cessation handout, if applicable  - Encourage broncho-pulmonary hygiene including cough, deep breathe, Incentive Spirometry  - Assess the need for suctioning and perform as needed  - Ass

## 2021-03-31 PROCEDURE — 82728 ASSAY OF FERRITIN: CPT | Performed by: STUDENT IN AN ORGANIZED HEALTH CARE EDUCATION/TRAINING PROGRAM

## 2021-03-31 PROCEDURE — 85379 FIBRIN DEGRADATION QUANT: CPT | Performed by: STUDENT IN AN ORGANIZED HEALTH CARE EDUCATION/TRAINING PROGRAM

## 2021-03-31 PROCEDURE — 85025 COMPLETE CBC W/AUTO DIFF WBC: CPT | Performed by: STUDENT IN AN ORGANIZED HEALTH CARE EDUCATION/TRAINING PROGRAM

## 2021-03-31 PROCEDURE — 80048 BASIC METABOLIC PNL TOTAL CA: CPT | Performed by: STUDENT IN AN ORGANIZED HEALTH CARE EDUCATION/TRAINING PROGRAM

## 2021-03-31 PROCEDURE — 86140 C-REACTIVE PROTEIN: CPT | Performed by: STUDENT IN AN ORGANIZED HEALTH CARE EDUCATION/TRAINING PROGRAM

## 2021-03-31 RX ORDER — AZITHROMYCIN 250 MG/1
500 TABLET, FILM COATED ORAL
Status: DISCONTINUED | OUTPATIENT
Start: 2021-03-31 | End: 2021-04-03

## 2021-03-31 NOTE — PLAN OF CARE
Patient stated  feeling better. He is being weaned off his oxygen. He is currently on 3L of oxygen and tolerating well. Desaturates down to high 80s with some exertion. He self prones.     Problem: Patient Centered Care  Goal: Patient preferences are ident effects  - Notify MD/LIP if interventions unsuccessful or patient reports new pain  - Anticipate increased pain with activity and pre-medicate as appropriate  Outcome: Progressing     Problem: SAFETY ADULT - FALL  Goal: Free from fall injury  Description: and minimize respiratory effort  - Oxygen supplementation based on oxygen saturation or ABGs  - Provide Smoking Cessation handout, if applicable  - Encourage broncho-pulmonary hygiene including cough, deep breathe, Incentive Spirometry  - Assess the need f

## 2021-03-31 NOTE — H&P
[facility] Hospitalist Progress Note     CC: Patient presents with:  Covid     PCP: Karen Clark MD    Date of Admission: 3/25/2021  9:40 AM    ASSESSMENT / PLAN:     Ms. Jose Alfredo Loomis is a 61 yo M with PMH of HTN, HL who was recently diagnosed with COVID on 3/20, Michaela   • OTHER SURGICAL HISTORY      part of bowel removed for \"twisted bowel\"        ALL:  No Known Allergies     Home Medications:  Mometasone Furoate 50 MCG/ACT Nasal Suspension, INSTILL 1 SPRAY INTO EACH NOSTRIL EVERY DAY, Disp: 1 Bottle, Rfl: SpO2 94%   BMI 26.49 kg/m²     GEN: male in NAD, mild tachypnea, tired  HEENT: EOMI  Pulm: CTAB, no crackles or wheezes, on supplemental o2  CV: regular rate, regular rhythm, no murmurs  ABD: Soft, non-tender, non-distended, +BS  Neuro: Grossly normal, CN

## 2021-03-31 NOTE — DIETARY NOTE
ADULT NUTRITION REASSESSMENT    Pt is at moderate nutrition risk but now downgraded to Low risk as Nutrition Diagnosis for inadequate po intake is resolved.      RECOMMENDATIONS TO MD:None at this time    ADMITTING DIAGNOSIS: Hypoxia [R09.02]Pneumonia due t 175.3 cm (5' 9\")  WT: 81.4 kg (179 lb 6.4 oz) --no new wt, requested verbally from RN to obtain new wt. BMI: Body mass index is 26.49 kg/m².   BMI CLASSIFICATION: 25-29.9 kg/m2 - overweight  IBW: 160 lbs        111% IBW  Usual Body Wt: 185 lbs       96%

## 2021-03-31 NOTE — PROGRESS NOTES
[facility] AND [facility] Infectious Disease  Progress Note    Alisa Ivory Patient Status:  Inpatient    1955 MRN [id number]   Location [address] [place] [address] Attending Latoya Alvarado MD   Hosp Day # 6 PCP Ashley Hutchins MD     Subjective:  Patient Lab 03/25/21  0949 03/25/21  0949 03/26/21  0047 03/26/21  0609 03/29/21  0527 03/29/21  0527 03/30/21  0451 03/31/21  0530   LDH  --   --  409*   < > 529*  --  502*  --    SANDY 1,442.5*  --   --    < > 1,397.1*   < > 1,270.7* 1,023.8*   DDIMER 0.95*   <

## 2021-03-31 NOTE — PLAN OF CARE
Patient AxOx4, weaning oxygen as tolerated. Reports improvement in breathing. No complaints of pain. Tolerating PO intake well. IV patent and saline locked. Patient calls for needs appropriately.      Problem: Patient Centered Care  Goal: Patient preference severity of pain and evaluate response  - Implement non-pharmacological measures as appropriate and evaluate response  - Consider cultural and social influences on pain and pain management  - Manage/alleviate anxiety  - Utilize distraction and/or relaxatio Management Department for coordinating discharge planning if the patient needs post-hospital services based on physician/LIP order or complex needs related to functional status, cognitive ability or social support system  3/31/2021 1324 by Raciel Pinto RN

## 2021-03-31 NOTE — CM/SW NOTE
Per documentation the pt. Is currently on 3L HF O2. - hopeful to continue to wean. Plan will be to return home when medically stable. SW/CM team to follow for discharge planning.       Lorenza Kidd, [facility] ext [phone]

## 2021-03-31 NOTE — PROGRESS NOTES
[facility] Pulmonary, Critical Care and Sleep    Markiki Garcia Patient Status:  Inpatient    1955 MRN [id number]   Location [facility] Attending Armando Castro MD   Hosp Day # 6 PCP Karen Clark MD       Date of Admission: 3/25/2021  9:40 A bilaterally. Abd: Nontender, non distended. Ext: No edema. Skin: No rashes.      Recent Labs   Lab 03/29/21  0527 03/30/21  0451 03/31/21  0530   WBC 7.8 12.2* 11.7*   HGB 13.2 13.3 11.9*   HCT 37.3* 38.0* 34.2*   .0 218.0 222.0     Recent Labs azithromycin  - ID following  3. Prophy  - LMWH  4. Dispo  - will follow    My best regards,     Jamey Navarro MD  Pulmonary, Critical Care and Sleep Medicine.

## 2021-03-31 NOTE — PROGRESS NOTES
[facility] Pharmacy Note: Route Optimization for Azithromycin [facility])    Patient is currently on Azithromycin (ZITHROMAX) 500 mg IV every 24 hours.    The patient meets the criteria to convert to the oral equivalent as established by the IV to Oral conversion

## 2021-03-31 NOTE — PAYOR COMM NOTE
--------------  CONTINUED STAY REVIEW    Payor: Kathya Friend [employer]OR [employer] PPO  Subscriber #:  [id number]  Authorization Number: [id number]    Admit date: 3/25/21  Admit time: 12:17 PM    Admitting Physician: Jerad Delgado DO  Attending Physician:  Paulina Alva and azithromycin day #6/10     2.  Abnormal labs due to COVID+ status  (Hepatitis negative, HIV negative)             Recent Labs   Lab 03/25/21  8259 03/25/21  0669 03/26/21  0047 03/26/21  8481 03/29/21  0527 03/29/21  0527 03/30/21  0451 03/31/21  0530 Location:       Right arm   Pulse: 81 75 74 93   Resp:     18 18   Temp:   98 °F (36.7 °C)   98.3 °F (36.8 °C)   TempSrc:       Oral   SpO2: 92% 93% 92% 91%   Weight:           Height:                 Physical Exam:  Exam deferred to preserve PPE     Medic sodium (COLACE) cap 100 mg     Date Action Dose Route User    3/31/2021 1994 Given 100 mg Oral Stephany Yogi, RN      lisinopril 10 mg, amLODIPine Besylate (NORVASC) 5 mg for Lotrel 5/10 ([facility] only)     Date Action Dose Route User    3/31/2021 4922 Given (no

## 2021-04-01 PROCEDURE — 80053 COMPREHEN METABOLIC PANEL: CPT | Performed by: HOSPITALIST

## 2021-04-01 PROCEDURE — 85379 FIBRIN DEGRADATION QUANT: CPT | Performed by: HOSPITALIST

## 2021-04-01 PROCEDURE — 83615 LACTATE (LD) (LDH) ENZYME: CPT | Performed by: HOSPITALIST

## 2021-04-01 PROCEDURE — 86140 C-REACTIVE PROTEIN: CPT | Performed by: HOSPITALIST

## 2021-04-01 PROCEDURE — 85025 COMPLETE CBC W/AUTO DIFF WBC: CPT | Performed by: HOSPITALIST

## 2021-04-01 PROCEDURE — 82550 ASSAY OF CK (CPK): CPT | Performed by: HOSPITALIST

## 2021-04-01 PROCEDURE — 82728 ASSAY OF FERRITIN: CPT | Performed by: HOSPITALIST

## 2021-04-01 NOTE — PLAN OF CARE
Problem: Patient Centered Care  Goal: Patient preferences are identified and integrated in the patient's plan of care  Description: Interventions:  - What would you like us to know as we care for you?  I live at home with my wife  - Provide timely, comple Problem: SAFETY ADULT - FALL  Goal: Free from fall injury  Description: INTERVENTIONS:  - Assess pt frequently for physical needs  - Identify cognitive and physical deficits and behaviors that affect risk of falls.   - [place] fall precautions as indica hygiene including cough, deep breathe, Incentive Spirometry  - Assess the need for suctioning and perform as needed  - Assess and instruct to report SOB or any respiratory difficulty  - Respiratory Therapy support as indicated  - Manage/alleviate anxiety

## 2021-04-01 NOTE — H&P
[facility] Hospitalist Progress Note     CC: Patient presents with:  Covid     PCP: Henrik Gutierrez MD    Date of Admission: 3/25/2021  9:40 AM    ASSESSMENT / PLAN:     Ms. 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

## 2021-04-01 NOTE — PROGRESS NOTES
[facility] AND [facility] Infectious Disease  Progress Note    Janay Soto Patient Status:  Inpatient    1955 MRN [id number]   Location [address] [place] [address] Attending Jasmin Felipe MD   Hosp Day # 7 PCP Elvis Campbell MD     Subjective:  Patient failure due to COVID+ status with severe pneumonia on CT  - Currently on 3L of O2 supplementation and intermittent self prone strategy ongoing  - s/p BAM 3/20/21  - Dexamethasone day #8  - Remdesivir complete 3/29  - Baricitinib day #6/10  - Ceftriaxone an

## 2021-04-01 NOTE — PLAN OF CARE
Patient AxOx4. Vitals stable, satting well on 1L O2, does desat on exertion. No complaints of pain. Receiving iv rocephin and remdesvir still. Tolerating PO intake well. Calls for needs.     Problem: Patient Centered Care  Goal: Patient preferences are iden effects  - Notify MD/LIP if interventions unsuccessful or patient reports new pain  - Anticipate increased pain with activity and pre-medicate as appropriate  Outcome: Progressing     Problem: SAFETY ADULT - FALL  Goal: Free from fall injury  Description: and minimize respiratory effort  - Oxygen supplementation based on oxygen saturation or ABGs  - Provide Smoking Cessation handout, if applicable  - Encourage broncho-pulmonary hygiene including cough, deep breathe, Incentive Spirometry  - Assess the need f

## 2021-04-01 NOTE — PAYOR COMM NOTE
--------------  4/1 CONTINUED STAY REVIEW    Payor: [employer] PPO  Subscriber #:  [id number]  Authorization Number: [id number]      ID:    Subjective:  Patient's course reviewed. No F/C. No new issues.   O2 down to 3L O2.       Objective:  B  Inflammatory markers continue to trend favorably.  Wean O2 as able, also trending favorably.  Trending temps and WBCs.  Continue antibiotics as well as steroids and baricitinib as Rx.  Continues to slowly improve.       CM:  Per nursing rounds pt desat's w Action Dose Route User    4/1/2021 0816 Given 20 mg Oral Isabela Vernon RN      azithromycin [facility]) tab 500 mg     Date Action Dose Route User    4/1/2021 0816 Given 500 mg Oral Isabela Vernon RN    3/31/2021 1217 Given 500 mg Oral Isabela Vernon RN 3/31/2021 1216 New Bag 100 mg Intravenous Sita Rosales RN      zinc sulfate (ZINCATE) cap 220 mg     Date Action Dose Route User    4/1/2021 0815 Given 220 mg Oral Sita Roslaes RN    3/31/2021 2011 Given 220 mg Oral Sterling Palacios RN

## 2021-04-02 PROCEDURE — 82728 ASSAY OF FERRITIN: CPT | Performed by: HOSPITALIST

## 2021-04-02 PROCEDURE — 86140 C-REACTIVE PROTEIN: CPT | Performed by: HOSPITALIST

## 2021-04-02 PROCEDURE — 83615 LACTATE (LD) (LDH) ENZYME: CPT | Performed by: HOSPITALIST

## 2021-04-02 PROCEDURE — 85379 FIBRIN DEGRADATION QUANT: CPT | Performed by: HOSPITALIST

## 2021-04-02 PROCEDURE — 85025 COMPLETE CBC W/AUTO DIFF WBC: CPT | Performed by: HOSPITALIST

## 2021-04-02 PROCEDURE — 82550 ASSAY OF CK (CPK): CPT | Performed by: HOSPITALIST

## 2021-04-02 PROCEDURE — 80053 COMPREHEN METABOLIC PANEL: CPT | Performed by: HOSPITALIST

## 2021-04-02 NOTE — PROGRESS NOTES
Walk Test:     Patient's O2 sat on room air is 95% at rest. Pt's O2 sat on room air is 75% when ambulating, and 90% on 1 liter while ambulating.

## 2021-04-02 NOTE — CM/SW NOTE
4/2 143pm: O2 order has been cosigned and sent via [employer]. KENNEY notified Silke/KAMRON of the above.   SW waiting on confirmation that everything has been approved and will deliver the portable tank to the pt's nurse.   ---------------------  4/2 1255pm: SW was no

## 2021-04-02 NOTE — PROGRESS NOTES
[facility] Hospitalist Progress Note     CC: Patient presents with:  Covid     PCP: Helen Mcgarry MD    Date of Admission: 3/25/2021  9:40 AM    ASSESSMENT / PLAN:     Ms. Mirtha Carrion is a 59 yo M with PMH of HTN, HL who was recently diagnosed with COVID on 3/20, Medications:  Mometasone Furoate 50 MCG/ACT Nasal Suspension, INSTILL 1 SPRAY INTO EACH NOSTRIL EVERY DAY, Disp: 1 Bottle, Rfl: 3  aspirin 81 MG Oral Chew Tab, Chew 1 mg by mouth daily. , Disp: , Rfl:   Coenzyme Q10 (CO Q-10) 100 MG Oral Cap, Take by mouth. crackles or wheezes, on supplemental o2  CV: regular rate, regular rhythm, no murmurs  ABD: Soft, non-tender, non-distended, +BS  Neuro: Grossly normal, CN intact, sensory intact  Psych: Affect- normal  SKIN: warm, dry  EXT: no edema    Diagnostic Data:

## 2021-04-02 NOTE — PLAN OF CARE
Problem: Patient Centered Care  Goal: Patient preferences are identified and integrated in the patient's plan of care  Description: Interventions:  - What would you like us to know as we care for you?  I live at home with my wife  - Provide timely, comple Problem: SAFETY ADULT - FALL  Goal: Free from fall injury  Description: INTERVENTIONS:  - Assess pt frequently for physical needs  - Identify cognitive and physical deficits and behaviors that affect risk of falls.   - [place] fall precautions as indica hygiene including cough, deep breathe, Incentive Spirometry  - Assess the need for suctioning and perform as needed  - Assess and instruct to report SOB or any respiratory difficulty  - Respiratory Therapy support as indicated  - Manage/alleviate anxiety

## 2021-04-02 NOTE — PLAN OF CARE
Problem: Patient Centered Care  Goal: Patient preferences are identified and integrated in the patient's plan of care  Description: Interventions:  - What would you like us to know as we care for you?  I live at home with my wife  - Provide timely, comple Educate pt/family on patient safety including physical limitations  - Instruct pt to call for assistance with activity based on assessment  - Modify environment to reduce risk of injury  - Provide assistive devices as appropriate  - Consider OT/PT consult of CO2 retention  Outcome: Progressing     Problem: Patient/Family Goals  Goal: Patient/Family Long Term Goal  Description: Patient's Long Term Goal: to go home    Interventions:  - Follow MD recommendations  - Monitor VS  - Monitor Labs  - Discharge Plann

## 2021-04-02 NOTE — PROGRESS NOTES
Janay Soto is a 60year old male recently diagnosed with COVID on 3/20, received BAM at that time. stable inflammatory markers and O2 requirements. Now admitted to [address] [place] [address], on 3/25/21.      HPI:    Patient in covid isolation,   Previus entries noted,   Physical Anion Gap 6 0 - 18 mmol/L    BUN 13 7 - 18 mg/dL    Creatinine 1.21 0.70 - 1.30 mg/dL    BUN/CREA Ratio 10.7 10.0 - 20.0    Calcium, Total 8.2 (L) 8.5 - 10.1 mg/dL    Calculated Osmolality 281 275 - 295 mOsm/kg    GFR, Non- 63 >=60    GFR, mmol/L    Potassium 3.6 3.5 - 5.1 mmol/L    Chloride 106 98 - 112 mmol/L    CO2 22.0 21.0 - 32.0 mmol/L    Anion Gap 9 0 - 18 mmol/L    BUN 26 (H) 7 - 18 mg/dL    Creatinine 1.29 0.70 - 1.30 mg/dL    BUN/CREA Ratio 20.2 (H) 10.0 - 20.0    Calcium, Total 8. Antigen Antibody Combo Non-Reactive Non-Reactive   COMP METABOLIC PANEL (14)   Result Value Ref Range    Glucose 113 (H) 70 - 99 mg/dL    Sodium 140 136 - 145 mmol/L    Potassium 3.5 3.5 - 5.1 mmol/L    Chloride 109 98 - 112 mmol/L    CO2 25.0 21.0 - 32.0 ug/mL FEU   LDH   Result Value Ref Range     (H) 87 - 241 U/L   C-REACTIVE PROTEIN   Result Value Ref Range    C-Reactive Protein 1.43 (H) <0.30 mg/dL   FERRITIN   Result Value Ref Range    Ferritin 1,639.4 (H) 30.0 - 530.0 ng/mL   MAGNESIUM   Resul Phosphatase 96 45 - 117 U/L    Bilirubin, Total 0.9 0.1 - 2.0 mg/dL    Total Protein 6.4 6.4 - 8.2 g/dL    Albumin 3.0 (L) 3.4 - 5.0 g/dL    Globulin  3.4 2.8 - 4.4 g/dL    A/G Ratio 0.9 (L) 1.0 - 2.0   FERRITIN   Result Value Ref Range    Ferritin 1,270.7 Globulin  3.3 2.8 - 4.4 g/dL    A/G Ratio 0.8 (L) 1.0 - 2.0   D-DIMER   Result Value Ref Range    D-Dimer 0.49 <0.64 ug/mL FEU   FERRITIN   Result Value Ref Range    Ferritin 1,072.2 (H) 30.0 - 530.0 ng/mL   LDH   Result Value Ref Range     (H) 87 - Hold Gold Auto Resulted    RAINBOW DRAW BLUE   Result Value Ref Range    Hold Blue Auto Resulted    BLOOD CULTURE    Specimen: Blood,peripheral   Result Value Ref Range    Blood Culture Result No Growth 5 Days    BLOOD CULTURE    Specimen: Blood,periphe 11.4 %    Monocyte % 7.4 %    Eosinophil % 0.0 %    Basophil % 0.2 %    Immature Granulocyte % 0.5 %   CBC W/ DIFFERENTIAL   Result Value Ref Range    WBC 10.0 4.0 - 11.0 x10(3) uL    RBC 4.25 (L) 4.30 - 5.70 x10(6)uL    HGB 12.7 (L) 13.0 - 17.5 g/dL    HC HGB 13.2 13.0 - 17.5 g/dL    HCT 37.3 (L) 39.0 - 53.0 %    MCV 85.0 80.0 - 100.0 fL    MCH 30.1 26.0 - 34.0 pg    MCHC 35.4 31.0 - 37.0 g/dL    RDW-SD 38.6 35.1 - 46.3 fL    RDW 12.5 11.0 - 15.0 %    .0 150.0 - 450.0 10(3)uL    Neutrophil Absolut 222.0 150.0 - 450.0 10(3)uL    Neutrophil Absolute Prelim 9.74 (H) 1.50 - 7.70 x10 (3) uL    Neutrophil Absolute 9.74 (H) 1.50 - 7.70 x10(3) uL    Lymphocyte Absolute 0.98 (L) 1.00 - 4.00 x10(3) uL    Monocyte Absolute 0.73 0.10 - 1.00 x10(3) uL    Eosinop 0.10 - 1.00 x10(3) uL    Eosinophil Absolute 0.00 0.00 - 0.70 x10(3) uL    Basophil Absolute 0.01 0.00 - 0.20 x10(3) uL    Immature Granulocyte Absolute 0.16 0.00 - 1.00 x10(3) uL    Neutrophil % 78.9 %    Lymphocyte % 10.0 %    Monocyte % 8.8 %    Eosinop Ceftriaxone and Azithromycin, d # 8      PLAN:   1.  A middle aged male with severe covid Pneumonia, improving slowly, on COVID specific treatment, agrees with investigational therapy, all questions answered, bahman villalobos,   I discussed my Assessment and Plan

## 2021-04-03 VITALS
RESPIRATION RATE: 16 BRPM | SYSTOLIC BLOOD PRESSURE: 106 MMHG | WEIGHT: 176.5 LBS | HEART RATE: 75 BPM | HEIGHT: 69 IN | BODY MASS INDEX: 26.14 KG/M2 | TEMPERATURE: 98 F | OXYGEN SATURATION: 92 % | DIASTOLIC BLOOD PRESSURE: 62 MMHG

## 2021-04-03 PROCEDURE — 84145 PROCALCITONIN (PCT): CPT | Performed by: INTERNAL MEDICINE

## 2021-04-03 PROCEDURE — 85025 COMPLETE CBC W/AUTO DIFF WBC: CPT | Performed by: STUDENT IN AN ORGANIZED HEALTH CARE EDUCATION/TRAINING PROGRAM

## 2021-04-03 PROCEDURE — 80053 COMPREHEN METABOLIC PANEL: CPT | Performed by: INTERNAL MEDICINE

## 2021-04-03 RX ORDER — ALBUTEROL SULFATE 90 UG/1
4 AEROSOL, METERED RESPIRATORY (INHALATION) EVERY 4 HOURS PRN
Qty: 1 INHALER | Refills: 0 | Status: SHIPPED | OUTPATIENT
Start: 2021-04-03 | End: 2021-04-18

## 2021-04-03 RX ORDER — AMLODIPINE BESYLATE AND BENAZEPRIL HYDROCHLORIDE 5; 10 MG/1; MG/1
1 CAPSULE ORAL DAILY
Qty: 90 CAPSULE | Refills: 3 | Status: SHIPPED | COMMUNITY
Start: 2021-04-12 | End: 2021-04-13

## 2021-04-03 RX ORDER — BENZONATATE 100 MG/1
100 CAPSULE ORAL 3 TIMES DAILY PRN
Qty: 15 CAPSULE | Refills: 0 | Status: SHIPPED | OUTPATIENT
Start: 2021-04-03

## 2021-04-03 NOTE — PLAN OF CARE
Problem: Patient Centered Care  Goal: Patient preferences are identified and integrated in the patient's plan of care  Description: Interventions:  - What would you like us to know as we care for you?  I live at home with my wife  - Provide timely, comple SAFETY ADULT - FALL  Goal: Free from fall injury  Description: INTERVENTIONS:  - Assess pt frequently for physical needs  - Identify cognitive and physical deficits and behaviors that affect risk of falls.   - [place] fall precautions as indicated by asse cough, deep breathe, Incentive Spirometry  - Assess the need for suctioning and perform as needed  - Assess and instruct to report SOB or any respiratory difficulty  - Respiratory Therapy support as indicated  - Manage/alleviate anxiety  - Monitor for sign

## 2021-04-03 NOTE — CM/SW NOTE
Pt discharging today with HME Home O2. Alonso Lujan RN has already notified Dhara Leavens from [facility] of discharge and concentrator will be delivered. Portable tank already delivered to bedside.     / to remain available for support and/or disch

## 2021-04-03 NOTE — PLAN OF CARE
Problem: SAFETY ADULT - FALL  Goal: Free from fall injury  Description: INTERVENTIONS:  - Assess pt frequently for physical needs  - Identify cognitive and physical deficits and behaviors that affect risk of falls.   - [place] fall precautions as indica

## 2021-04-03 NOTE — PROGRESS NOTES
Remedios Swift is a 60year old male recently diagnosed with COVID on 3/20, received BAM at that time. stable inflammatory markers and O2 requirements. Now admitted to [address] [place] [address], on 3/25/21.      HPI:    Patient in covid isolation,   Previus entries noted,   Physical Chloride 102 98 - 112 mmol/L    CO2 27.0 21.0 - 32.0 mmol/L    Anion Gap 6 0 - 18 mmol/L    BUN 13 7 - 18 mg/dL    Creatinine 1.21 0.70 - 1.30 mg/dL    BUN/CREA Ratio 10.7 10.0 - 20.0    Calcium, Total 8.2 (L) 8.5 - 10.1 mg/dL    Calculated Osmolality 281 Range    Glucose 126 (H) 70 - 99 mg/dL    Sodium 137 136 - 145 mmol/L    Potassium 3.6 3.5 - 5.1 mmol/L    Chloride 106 98 - 112 mmol/L    CO2 22.0 21.0 - 32.0 mmol/L    Anion Gap 9 0 - 18 mmol/L    BUN 26 (H) 7 - 18 mg/dL    Creatinine 1.29 0.70 - 1.30 mg <=2.0 pg/mL   HIV AG AB COMBO   Result Value Ref Range    HIV Antigen Antibody Combo Non-Reactive Non-Reactive   COMP METABOLIC PANEL (14)   Result Value Ref Range    Glucose 113 (H) 70 - 99 mg/dL    Sodium 140 136 - 145 mmol/L    Potassium 3.5 3.5 - 5.1 m 2. 0   D-DIMER   Result Value Ref Range    D-Dimer 0.58 <0.64 ug/mL FEU   LDH   Result Value Ref Range     (H) 87 - 241 U/L   C-REACTIVE PROTEIN   Result Value Ref Range    C-Reactive Protein 1.43 (H) <0.30 mg/dL   FERRITIN   Result Value Ref Range  (H) 16 - 61 U/L    AST 60 (H) 15 - 37 U/L    Alkaline Phosphatase 96 45 - 117 U/L    Bilirubin, Total 0.9 0.1 - 2.0 mg/dL    Total Protein 6.4 6.4 - 8.2 g/dL    Albumin 3.0 (L) 3.4 - 5.0 g/dL    Globulin  3.4 2.8 - 4.4 g/dL    A/G Ratio 0.9 (L) 1.0 6.0 (L) 6.4 - 8.2 g/dL    Albumin 2.7 (L) 3.4 - 5.0 g/dL    Globulin  3.3 2.8 - 4.4 g/dL    A/G Ratio 0.8 (L) 1.0 - 2.0   D-DIMER   Result Value Ref Range    D-Dimer 0.49 <0.64 ug/mL FEU   FERRITIN   Result Value Ref Range    Ferritin 1,072.2 (H) 30.0 - 53 5 0 - 18 mmol/L    BUN 24 (H) 7 - 18 mg/dL    Creatinine 0.95 0.70 - 1.30 mg/dL    BUN/CREA Ratio 25.3 (H) 10.0 - 20.0    Calcium, Total 8.1 (L) 8.5 - 10.1 mg/dL    Calculated Osmolality 284 275 - 295 mOsm/kg    GFR, Non- 84 >=60    GFR, Af uL    Neutrophil % 81.9 %    Lymphocyte % 9.1 %    Monocyte % 8.0 %    Eosinophil % 0.0 %    Basophil % 0.0 %    Immature Granulocyte % 1.0 %   CBC W/ DIFFERENTIAL   Result Value Ref Range    WBC 5.8 4.0 - 11.0 x10(3) uL    RBC 4.28 (L) 4.30 - 5.70 x10(6)u x10(3) uL    RBC 4.20 (L) 4.30 - 5.70 x10(6)uL    HGB 12.6 (L) 13.0 - 17.5 g/dL    HCT 36.5 (L) 39.0 - 53.0 %    MCV 86.9 80.0 - 100.0 fL    MCH 30.0 26.0 - 34.0 pg    MCHC 34.5 31.0 - 37.0 g/dL    RDW-SD 40.1 35.1 - 46.3 fL    RDW 12.7 11.0 - 15.0 %    PL 11.0 - 15.0 %    .0 150.0 - 450.0 10(3)uL    Neutrophil Absolute Prelim 10.07 (H) 1.50 - 7.70 x10 (3) uL    Neutrophil Absolute 10.07 (H) 1.50 - 7.70 x10(3) uL    Lymphocyte Absolute 0.86 (L) 1.00 - 4.00 x10(3) uL    Monocyte Absolute 1.02 (H) 0.10 4.00 x10(3) uL    Monocyte Absolute 0.53 0.10 - 1.00 x10(3) uL    Eosinophil Absolute 0.00 0.00 - 0.70 x10(3) uL    Basophil Absolute 0.01 0.00 - 0.20 x10(3) uL    Immature Granulocyte Absolute 0.24 0.00 - 1.00 x10(3) uL    Neutrophil % 80.7 %    Lymphocyt Neutrophil % 79.7 %    Lymphocyte % 9.4 %    Monocyte % 7.7 %    Eosinophil % 0.0 %    Basophil % 0.3 %    Immature Granulocyte % 2.9 %       ROS:   GENERAL HEALTH: No fevers, chills, unexpected weight loss.   HEENT:  Denies sore throat, neck pain,neck rigi treatment, agrees with investigational therapy, all questions answered, Can be discharged home off of treatment, stable per ID, Discharge planning per PCP. Needs isolation until 4/09/21.    I discussed my Assessment and Plan in detail with the patient, richard

## 2021-04-06 NOTE — DISCHARGE SUMMARY
General Medicine Discharge Summary     Patient ID:  Ashok Harrison  60year old  9/24/1955    Admit date: 3/25/2021    Discharge date and time: 4/3/2021  4:48 PM     Attending Physician: Dr. Dudley Lucio: Marie GIPSON T currently on 1.2L, increased work of breathing with minimal exertion  - desat screen and home O2 setup     Hepatic steatosis  - noted on CT PE  - denies ETOH use  - LFTs wnl     Hyperglycemia, 2/2 steroids  - no hx DM2     HTN  - home amlodipine- benazepri [address] [facility] [address]    Phone: [phone]   · Albuterol Sulfate  (90 Base) MCG/ACT Aers  · benzonatate 100 MG Gely Vazquez MD In 1 week.     Specialty: PULMONARY DISEASES  Contact inform

## 2021-04-07 ENCOUNTER — TELEMEDICINE (OUTPATIENT)
Dept: CARDIOLOGY CLINIC | Facility: HOSPITAL | Age: 66
End: 2021-04-07
Payer: COMMERCIAL

## 2021-04-07 DIAGNOSIS — J12.82 PNEUMONIA DUE TO COVID-19 VIRUS: Primary | ICD-10-CM

## 2021-04-07 DIAGNOSIS — U07.1 PNEUMONIA DUE TO COVID-19 VIRUS: Primary | ICD-10-CM

## 2021-04-07 PROBLEM — R79.89 ELEVATED LFTS: Status: ACTIVE | Noted: 2021-04-07

## 2021-04-07 PROCEDURE — 99202 OFFICE O/P NEW SF 15 MIN: CPT | Performed by: CLINICAL NURSE SPECIALIST

## 2021-04-07 NOTE — PATIENT INSTRUCTIONS
Continue monitoring your oxygen levels daily, especially with activity     If oxygen levels are less than 90%, please call the [facility] or Dr Sha Smith office.

## 2021-04-07 NOTE — PROGRESS NOTES
[facility] Patient Status:  No patient class for patient encounter    1955 MRN [id number]   Location MD Dr Charles Castellon is a 60year old male who pres CO2 25.0 04/03/2021 05:13 AM    GLU 93 04/03/2021 05:13 AM    CA 8.1 (L) 04/03/2021 05:13 AM    ALB 2.7 (L) 04/03/2021 05:13 AM    ALKPHO 82 04/03/2021 05:13 AM    BILT 0.7 04/03/2021 05:13 AM    TP 6.0 (L) 04/03/2021 05:13 AM    AST 14 (L) 04/03/2021 0 office. I spent 20 minutes with this patient providing counseling, coordination of care and education related specifically to [place].       Pamala Riedel, APRN  4/7/2021  1:55 PM

## 2021-04-16 ENCOUNTER — OFFICE VISIT (OUTPATIENT)
Dept: WOUND CARE | Facility: HOSPITAL | Age: 66
End: 2021-04-16
Attending: NURSE PRACTITIONER
Payer: COMMERCIAL

## 2021-04-16 DIAGNOSIS — S21.201A: Primary | ICD-10-CM

## 2021-04-16 PROCEDURE — 97597 DBRDMT OPN WND 1ST 20 CM/<: CPT

## 2021-04-16 PROCEDURE — 99214 OFFICE O/P EST MOD 30 MIN: CPT

## 2021-04-16 NOTE — PROGRESS NOTES
Subjective    Chief Complaint  This information was obtained from the patient  The patient is new to the [address] here for an initial visit for the evaluation and management of non-healing wound(s). Wound of right side of back    Allergies  NKA    HPI (Hair/Skin/Nails): Ulcers (right upper back wound)  Endocrine:  Other (no hx of DM type two)    Patient denies complaints or symptoms related to:  Constitutional Symptoms (General Health): Chills, Fatigue, Loss of Appetite  Eyes: Vision Changes  Ear/Nose/Mo sero-sanguineous drainage noted which has no odor. The patient reports a wound pain of level 0/10. The wound margin is thickened. Wound bed has 1-25% eschar, 51-75% slough.   The periwound skin texture is normal. The periwound skin moisture is normal. The p wound of right back wall of thorax without penetration into thoracic cavity, initial encounter        Right upper back, thoracic area: trauma vs pressure injury  -the cause is not known to patient  -the location of the wound (over thoracic bones) and appea salt intake. S/S of infection - monitor for S&S of soft tissue infection such as fever, chills, erythema, increased drainage and foul odor. Follow-Up Appointments:  A follow-up appointment should be scheduled.         Discussed the possible etiology of 0.1  Stage: Unstageable Pressure Injury Obscured full-thickness skin and tissue loss      Area: (cm²) 4.32  Percent Debrided: 100  Total Area Debrided: (sq cm) 4.32  Volume: (cm³) 0.432  Devitalized Tissue Debrided:  Biofilm, Fibrin, Slough  Instrument: Bl

## 2021-04-28 ENCOUNTER — OFFICE VISIT (OUTPATIENT)
Dept: WOUND CARE | Facility: HOSPITAL | Age: 66
End: 2021-04-28
Attending: NURSE PRACTITIONER
Payer: COMMERCIAL

## 2021-04-28 DIAGNOSIS — S21.201D OPEN WOUND OF RIGHT BACK WALL OF THORAX WITHOUT PENETRATION INTO THORACIC CAVITY, SUBSEQUENT ENCOUNTER: Primary | ICD-10-CM

## 2021-04-28 PROCEDURE — 99213 OFFICE O/P EST LOW 20 MIN: CPT

## 2021-04-28 NOTE — PROGRESS NOTES
Subjective    Chief Complaint  This information was obtained from the patient  The patient returns today for follow up and management of right ankle venous wound .     Allergies  NKA    HPI  This information was obtained from the patient  4/14/2021: Patient to:  Constitutional Symptoms (General Health): Chills, Fatigue, Fever, Loss of Appetite  Eyes: Vision Changes  Ear/Nose/Mouth/Throat: Loss of Smell, Loss of Taste  Respiratory: Cough, Shortness of Breath  Cardiovascular (Central/Peripheral): Chest Pain  Ga Respiratory:effortless breathing. Cardiovascular:BLE varicose veins, with hemosiderin staining, BLE's edema well managed by compression therapy . Musculoskeletal:no clubbing, no cyanosis. No significant deformity or joint abnormality. No edema.  Peripher foam dressing over it  Cover dressing and wrap with blu. Do not put tape directly on the skin. Other: - zinc paste to periwound  vaseline ointment to lower leg    Change dressing every: - week    Compression Therapy:  Compression to right leg.   Compress BEATRICE, IVONP-BC, CWS  Procedure Performed for: Wound #1 Right, Medial Ankle  Performed By: Clinician ,  Procedural Pain: 0  Post Procedural Pain: 0  Response to Treatment: Procedure was tolerated well  Compression Layers: Multi-Layer  Compression Product Type:

## 2021-05-12 ENCOUNTER — APPOINTMENT (OUTPATIENT)
Dept: WOUND CARE | Facility: HOSPITAL | Age: 66
End: 2021-05-12
Attending: NURSE PRACTITIONER
Payer: COMMERCIAL

## 2024-12-29 ENCOUNTER — APPOINTMENT (OUTPATIENT)
Dept: CT IMAGING | Facility: HOSPITAL | Age: 69
End: 2024-12-29
Attending: EMERGENCY MEDICINE
Payer: COMMERCIAL

## 2024-12-29 ENCOUNTER — HOSPITAL ENCOUNTER (EMERGENCY)
Facility: HOSPITAL | Age: 69
Discharge: HOME | End: 2024-12-29
Attending: EMERGENCY MEDICINE
Payer: COMMERCIAL

## 2024-12-29 ENCOUNTER — APPOINTMENT (OUTPATIENT)
Dept: MRI IMAGING | Facility: HOSPITAL | Age: 69
End: 2024-12-29
Attending: EMERGENCY MEDICINE
Payer: COMMERCIAL

## 2024-12-29 VITALS
TEMPERATURE: 98 F | RESPIRATION RATE: 16 BRPM | SYSTOLIC BLOOD PRESSURE: 174 MMHG | WEIGHT: 190 LBS | DIASTOLIC BLOOD PRESSURE: 105 MMHG | BODY MASS INDEX: 28 KG/M2 | OXYGEN SATURATION: 96 % | HEART RATE: 78 BPM

## 2024-12-29 DIAGNOSIS — R20.2 PARESTHESIAS: Primary | ICD-10-CM

## 2024-12-29 LAB
ANION GAP SERPL CALC-SCNC: 5 MMOL/L (ref 0–18)
ATRIAL RATE: 80 BPM
BASOPHILS # BLD AUTO: 0.04 X10(3) UL (ref 0–0.2)
BASOPHILS NFR BLD AUTO: 0.6 %
BUN BLD-MCNC: 17 MG/DL (ref 9–23)
BUN/CREAT SERPL: 14.2 (ref 10–20)
CALCIUM BLD-MCNC: 9.7 MG/DL (ref 8.7–10.4)
CHLORIDE SERPL-SCNC: 107 MMOL/L (ref 98–112)
CO2 SERPL-SCNC: 29 MMOL/L (ref 21–32)
CREAT BLD-MCNC: 1.2 MG/DL
DEPRECATED RDW RBC AUTO: 42.3 FL (ref 35.1–46.3)
EGFRCR SERPLBLD CKD-EPI 2021: 66 ML/MIN/1.73M2 (ref 60–?)
EOSINOPHIL # BLD AUTO: 0.18 X10(3) UL (ref 0–0.7)
EOSINOPHIL NFR BLD AUTO: 2.7 %
ERYTHROCYTE [DISTWIDTH] IN BLOOD BY AUTOMATED COUNT: 12.8 % (ref 11–15)
GLUCOSE BLD-MCNC: 90 MG/DL (ref 70–99)
HCT VFR BLD AUTO: 41.7 %
HGB BLD-MCNC: 14.3 G/DL
IMM GRANULOCYTES # BLD AUTO: 0.01 X10(3) UL (ref 0–1)
IMM GRANULOCYTES NFR BLD: 0.2 %
LYMPHOCYTES # BLD AUTO: 2.97 X10(3) UL (ref 1–4)
LYMPHOCYTES NFR BLD AUTO: 45.3 %
MCH RBC QN AUTO: 30.7 PG (ref 26–34)
MCHC RBC AUTO-ENTMCNC: 34.3 G/DL (ref 31–37)
MCV RBC AUTO: 89.5 FL
MONOCYTES # BLD AUTO: 0.66 X10(3) UL (ref 0.1–1)
MONOCYTES NFR BLD AUTO: 10.1 %
NEUTROPHILS # BLD AUTO: 2.69 X10 (3) UL (ref 1.5–7.7)
NEUTROPHILS # BLD AUTO: 2.69 X10(3) UL (ref 1.5–7.7)
NEUTROPHILS NFR BLD AUTO: 41.1 %
OSMOLALITY SERPL CALC.SUM OF ELEC: 293 MOSM/KG (ref 275–295)
P AXIS: 59 DEGREES
P-R INTERVAL: 170 MS
PLATELET # BLD AUTO: 206 10(3)UL (ref 150–450)
POTASSIUM SERPL-SCNC: 4.1 MMOL/L (ref 3.5–5.1)
Q-T INTERVAL: 372 MS
QRS DURATION: 102 MS
QTC CALCULATION (BEZET): 429 MS
R AXIS: -44 DEGREES
RBC # BLD AUTO: 4.66 X10(6)UL
SODIUM SERPL-SCNC: 141 MMOL/L (ref 136–145)
T AXIS: 53 DEGREES
TROPONIN I SERPL HS-MCNC: 3 NG/L
VENTRICULAR RATE: 80 BPM
WBC # BLD AUTO: 6.6 X10(3) UL (ref 4–11)

## 2024-12-29 PROCEDURE — 80048 BASIC METABOLIC PNL TOTAL CA: CPT | Performed by: EMERGENCY MEDICINE

## 2024-12-29 PROCEDURE — 93010 ELECTROCARDIOGRAM REPORT: CPT

## 2024-12-29 PROCEDURE — 70551 MRI BRAIN STEM W/O DYE: CPT | Performed by: EMERGENCY MEDICINE

## 2024-12-29 PROCEDURE — 99285 EMERGENCY DEPT VISIT HI MDM: CPT

## 2024-12-29 PROCEDURE — 36415 COLL VENOUS BLD VENIPUNCTURE: CPT

## 2024-12-29 PROCEDURE — 85025 COMPLETE CBC W/AUTO DIFF WBC: CPT | Performed by: EMERGENCY MEDICINE

## 2024-12-29 PROCEDURE — 84484 ASSAY OF TROPONIN QUANT: CPT | Performed by: EMERGENCY MEDICINE

## 2024-12-29 PROCEDURE — 70496 CT ANGIOGRAPHY HEAD: CPT | Performed by: EMERGENCY MEDICINE

## 2024-12-29 PROCEDURE — 70498 CT ANGIOGRAPHY NECK: CPT | Performed by: EMERGENCY MEDICINE

## 2024-12-29 PROCEDURE — 93005 ELECTROCARDIOGRAM TRACING: CPT

## 2024-12-29 RX ORDER — LORAZEPAM 1 MG/1
1 TABLET ORAL ONCE
Status: COMPLETED | OUTPATIENT
Start: 2024-12-29 | End: 2024-12-29

## 2024-12-29 RX ORDER — METOPROLOL SUCCINATE 50 MG/1
50 TABLET, EXTENDED RELEASE ORAL DAILY
COMMUNITY

## 2024-12-29 RX ORDER — LEVOCETIRIZINE DIHYDROCHLORIDE 2.5 MG/5ML
50 SOLUTION ORAL EVERY EVENING
COMMUNITY

## 2024-12-29 NOTE — ED QUICK NOTES
Call received from on-call MRI tech, who is now indefinitely delayed in coming in for MRI. She advised we wait for AM MRI tech to arrive (approx. 6:45am). MD aware.

## 2024-12-29 NOTE — ED PROVIDER NOTES
Patient Seen in: [facility] Emergency Department      History     Chief Complaint   Patient presents with    Numbness     Stated Complaint: Numbness    Subjective:   The history is provided by the patient.         69 year old male with a past medical history of hyperlipidemia, hypertension who presents with intermittent numb/tingling to his left face, left upper extremity, left leg that started when he woke up yesterday at 9 AM and occurred intermittently throughout the day.  Patient states it will just come and go quickly, but when the episodes were not stabbing he decided to come to the ER.  The patient states there are no known inciting factors and he has never had this before.  He also noticed some blurred vision to the left eye, no blackout vision but states the vision in the left eye did not seem as sharp.  No fever/chills, chest pain, abdominal pain, nausea/vomiting, new medications or trauma.    Objective:     No pertinent past medical history.            No pertinent past surgical history.              No pertinent social history.                Physical Exam     ED Triage Vitals [12/29/24 0114]   BP (!) 164/98   Pulse 92   Resp 20   Temp 97.9 °F (36.6 °C)   Temp src    SpO2 95 %   O2 Device None (Room air)       Current Vitals:   Vital Signs  BP: (!) 174/105  Pulse: 78  Resp: 16  Temp: 97.9 °F (36.6 °C)  MAP (mmHg): (!) 119    Oxygen Therapy  SpO2: 96 %  O2 Device: None (Room air)        Physical Exam  Vitals and nursing note reviewed.   Constitutional:       General: He is not in acute distress.     Appearance: Normal appearance. He is well-developed. He is not ill-appearing, toxic-appearing or diaphoretic.   HENT:      Head: Normocephalic and atraumatic.   Eyes:      Extraocular Movements: Extraocular movements intact.      Conjunctiva/sclera: Conjunctivae normal.      Pupils: Pupils are equal, round, and reactive to light.   Cardiovascular:      Rate and Rhythm: Normal rate and regular rhythm.       Pulses: Normal pulses.      Heart sounds: Normal heart sounds. No murmur heard.  Pulmonary:      Effort: Pulmonary effort is normal. No respiratory distress.      Breath sounds: Normal breath sounds. No wheezing.   Abdominal:      General: There is no distension.      Palpations: Abdomen is soft.      Tenderness: There is no abdominal tenderness. There is no guarding.   Musculoskeletal:         General: No tenderness. Normal range of motion.      Cervical back: Full passive range of motion without pain, normal range of motion and neck supple. No rigidity. No spinous process tenderness or muscular tenderness. Normal range of motion.      Right lower leg: No edema.      Left lower leg: No edema.   Skin:     General: Skin is warm and dry.      Findings: No rash.   Neurological:      Mental Status: He is alert and oriented to person, place, and time.      GCS: GCS eye subscore is 4. GCS verbal subscore is 5. GCS motor subscore is 6.      Cranial Nerves: No cranial nerve deficit, dysarthria or facial asymmetry.      Sensory: Sensation is intact. No sensory deficit.      Motor: Motor function is intact. No weakness, tremor, seizure activity or pronator drift.      Coordination: Coordination normal.      Comments: Normal speech   Psychiatric:         Attention and Perception: Attention normal.         Mood and Affect: Mood normal.         Behavior: Behavior normal. Behavior is cooperative.           ED Course     Labs Reviewed   BASIC METABOLIC PANEL (8) - Normal   TROPONIN I HIGH SENSITIVITY - Normal   CBC WITH DIFFERENTIAL WITH PLATELET     EKG    Rate, intervals and axes as noted on EKG Report.  Rate: 80  Rhythm: Sinus Rhythm  Reading: Normal sinus rhythm                  MDM      Pulse Ox: 96%, Normal, RA    Cardiac Monitor:   Pulse Readings from Last 1 Encounters:   12/29/24 78   , sinus, normal for rate and rhythm     Prior radiology reviewed: no    Radiology findings: CTA BRAIN + CTA CAROTIDS  (CPT=70496/09493)    Result Date: 12/29/2024  CONCLUSION:  1.  CTA head: No stenosis, occlusion, or gross aneurysm in the anterior or posterior circulation. 2.  CTA neck: No stenosis, occlusion, or dissection of the carotids or vertebrals. 3.  Noncontrast head CT:  No acute intracranial process.  Dictated by (CST): Smooth Alamo MD on 12/29/2024 at 9:02 AM     Finalized by (CST): Smooth Alamo MD on 12/29/2024 at 9:06 AM          MRI BRAIN WO ACUTE (3) SEQUENCE (CPT=70551)    Result Date: 12/29/2024  CONCLUSION: No acute intracranial process.  No evidence of acute or subacute infarct.  Scattered FLAIR signal hyperintense foci within the cerebral white matter which may represent microvascular white matter ischemic change or sequela of prior inflammation or prior infection    Dictated by (CST): Smooth Alamo MD on 12/29/2024 at 8:00 AM     Finalized by (CST): Smooth Alamo MD on 12/29/2024 at 8:02 AM            Chronic Medical Conditions Potentially Contributing: hld, htn      Medications   LORazepam (Ativan) tab 1 mg (1 mg Oral Given 12/29/24 0509)   iopamidol 76% (ISOVUE-370) injection for power injector (80 mL Intravenous Given 12/29/24 0849)       D/w Dr Teresa (on call neuro) - agrees with MRI, advises get CTA Brain/Neck as well before dc    Discussed no acute concerning findings on MRI or CTA Brain/Neck with patient. Recommended f/u with PCP and with neurology. We discussed reasons to return to ER for any worsening sx.         Disposition and Plan     Clinical Impression:  1. Paresthesias         Disposition:  Discharge  12/29/2024  9:13 am    Follow-up:  Gabriela Morris MD  [address] [facility]  [address]  [place] [id number] [phone]    Schedule an appointment as soon as possible for a visit  Call for next available appointment    Latoya Teresa DO  [address]  [facility] [id number] [phone]    Schedule an appointment as soon as possible for a visit  Call for next available appointment    We  recommend that you schedule follow up care with a primary care provider within the next three months to obtain basic health screening including reassessment of your blood pressure.      Medications Prescribed:  Current Discharge Medication List              Supplementary Documentation:

## 2024-12-29 NOTE — ED INITIAL ASSESSMENT (HPI)
S: Pt presents to ED with c/o warmth and tingling to the left face, arm, and leg intermittently since 9am 12/28. Denies any weakness. Denies balance disturbance, Blurry vision to left eye that pt noticed today.     B: htn, cholesterol

## 2024-12-29 NOTE — ED QUICK NOTES
ER MD AT BEDSIDE FOR DISPOSITION      Patient approved for discharge per emergency department provider. PATIENT given verbal and written discharge instructions. Given instructions on follow up with NEUROLOGY, and symptoms that necessitate coming back to the emergency department. Verbalizes understanding of discharge instructions.     Patient aox 3 out of ED WITH STEADY GAIT. Resp unlabored